# Patient Record
Sex: FEMALE | Race: ASIAN | NOT HISPANIC OR LATINO | URBAN - METROPOLITAN AREA
[De-identification: names, ages, dates, MRNs, and addresses within clinical notes are randomized per-mention and may not be internally consistent; named-entity substitution may affect disease eponyms.]

---

## 2019-05-19 ENCOUNTER — INPATIENT (INPATIENT)
Facility: HOSPITAL | Age: 29
LOS: 1 days | Discharge: HOME | End: 2019-05-21
Attending: OBSTETRICS & GYNECOLOGY | Admitting: OBSTETRICS & GYNECOLOGY

## 2019-05-19 VITALS — DIASTOLIC BLOOD PRESSURE: 76 MMHG | SYSTOLIC BLOOD PRESSURE: 149 MMHG | HEART RATE: 72 BPM

## 2019-05-19 DIAGNOSIS — O26.899 OTHER SPECIFIED PREGNANCY RELATED CONDITIONS, UNSPECIFIED TRIMESTER: ICD-10-CM

## 2019-05-19 DIAGNOSIS — Z3A.00 WEEKS OF GESTATION OF PREGNANCY NOT SPECIFIED: ICD-10-CM

## 2019-05-19 LAB
ALBUMIN SERPL ELPH-MCNC: 3.8 G/DL — SIGNIFICANT CHANGE UP (ref 3.3–5)
ALP SERPL-CCNC: 129 U/L — HIGH (ref 40–120)
ALT FLD-CCNC: 14 U/L — SIGNIFICANT CHANGE UP (ref 4–33)
ANION GAP SERPL CALC-SCNC: 16 MMO/L — HIGH (ref 7–14)
APTT BLD: 31 SEC — SIGNIFICANT CHANGE UP (ref 27.5–36.3)
AST SERPL-CCNC: 20 U/L — SIGNIFICANT CHANGE UP (ref 4–32)
BASOPHILS # BLD AUTO: 0.07 K/UL — SIGNIFICANT CHANGE UP (ref 0–0.2)
BASOPHILS NFR BLD AUTO: 0.6 % — SIGNIFICANT CHANGE UP (ref 0–2)
BILIRUB SERPL-MCNC: 0.3 MG/DL — SIGNIFICANT CHANGE UP (ref 0.2–1.2)
BLD GP AB SCN SERPL QL: NEGATIVE — SIGNIFICANT CHANGE UP
BUN SERPL-MCNC: 5 MG/DL — LOW (ref 7–23)
CALCIUM SERPL-MCNC: 9.5 MG/DL — SIGNIFICANT CHANGE UP (ref 8.4–10.5)
CHLORIDE SERPL-SCNC: 109 MMOL/L — HIGH (ref 98–107)
CO2 SERPL-SCNC: 21 MMOL/L — LOW (ref 22–31)
CREAT SERPL-MCNC: 0.56 MG/DL — SIGNIFICANT CHANGE UP (ref 0.5–1.3)
EOSINOPHIL # BLD AUTO: 0.02 K/UL — SIGNIFICANT CHANGE UP (ref 0–0.5)
EOSINOPHIL NFR BLD AUTO: 0.2 % — SIGNIFICANT CHANGE UP (ref 0–6)
FIBRINOGEN PPP-MCNC: 724 MG/DL — HIGH (ref 350–510)
GLUCOSE SERPL-MCNC: 91 MG/DL — SIGNIFICANT CHANGE UP (ref 70–99)
HCT VFR BLD CALC: 39.6 % — SIGNIFICANT CHANGE UP (ref 34.5–45)
HGB BLD-MCNC: 13 G/DL — SIGNIFICANT CHANGE UP (ref 11.5–15.5)
IMM GRANULOCYTES NFR BLD AUTO: 2 % — HIGH (ref 0–1.5)
INR BLD: 0.82 — LOW (ref 0.88–1.17)
LDH SERPL L TO P-CCNC: 207 U/L — SIGNIFICANT CHANGE UP (ref 135–225)
LYMPHOCYTES # BLD AUTO: 1.06 K/UL — SIGNIFICANT CHANGE UP (ref 1–3.3)
LYMPHOCYTES # BLD AUTO: 9 % — LOW (ref 13–44)
MCHC RBC-ENTMCNC: 31 PG — SIGNIFICANT CHANGE UP (ref 27–34)
MCHC RBC-ENTMCNC: 32.8 % — SIGNIFICANT CHANGE UP (ref 32–36)
MCV RBC AUTO: 94.5 FL — SIGNIFICANT CHANGE UP (ref 80–100)
MONOCYTES # BLD AUTO: 0.91 K/UL — HIGH (ref 0–0.9)
MONOCYTES NFR BLD AUTO: 7.7 % — SIGNIFICANT CHANGE UP (ref 2–14)
NEUTROPHILS # BLD AUTO: 9.5 K/UL — HIGH (ref 1.8–7.4)
NEUTROPHILS NFR BLD AUTO: 80.5 % — HIGH (ref 43–77)
NRBC # FLD: 0.02 K/UL — SIGNIFICANT CHANGE UP (ref 0–0)
PLATELET # BLD AUTO: 187 K/UL — SIGNIFICANT CHANGE UP (ref 150–400)
PMV BLD: 10 FL — SIGNIFICANT CHANGE UP (ref 7–13)
POTASSIUM SERPL-MCNC: 3.8 MMOL/L — SIGNIFICANT CHANGE UP (ref 3.5–5.3)
POTASSIUM SERPL-SCNC: 3.8 MMOL/L — SIGNIFICANT CHANGE UP (ref 3.5–5.3)
PROT SERPL-MCNC: 6.7 G/DL — SIGNIFICANT CHANGE UP (ref 6–8.3)
PROTHROM AB SERPL-ACNC: 9.3 SEC — LOW (ref 9.8–13.1)
RBC # BLD: 4.19 M/UL — SIGNIFICANT CHANGE UP (ref 3.8–5.2)
RBC # FLD: 12.1 % — SIGNIFICANT CHANGE UP (ref 10.3–14.5)
RH IG SCN BLD-IMP: POSITIVE — SIGNIFICANT CHANGE UP
RH IG SCN BLD-IMP: POSITIVE — SIGNIFICANT CHANGE UP
SODIUM SERPL-SCNC: 146 MMOL/L — HIGH (ref 135–145)
T PALLIDUM AB TITR SER: NEGATIVE — SIGNIFICANT CHANGE UP
URATE SERPL-MCNC: 4 MG/DL — SIGNIFICANT CHANGE UP (ref 2.5–7)
WBC # BLD: 11.79 K/UL — HIGH (ref 3.8–10.5)
WBC # FLD AUTO: 11.79 K/UL — HIGH (ref 3.8–10.5)

## 2019-05-19 RX ORDER — IBUPROFEN 200 MG
600 TABLET ORAL EVERY 6 HOURS
Refills: 0 | Status: COMPLETED | OUTPATIENT
Start: 2019-05-19 | End: 2020-04-16

## 2019-05-19 RX ORDER — TETANUS TOXOID, REDUCED DIPHTHERIA TOXOID AND ACELLULAR PERTUSSIS VACCINE, ADSORBED 5; 2.5; 8; 8; 2.5 [IU]/.5ML; [IU]/.5ML; UG/.5ML; UG/.5ML; UG/.5ML
0.5 SUSPENSION INTRAMUSCULAR ONCE
Refills: 0 | Status: DISCONTINUED | OUTPATIENT
Start: 2019-05-19 | End: 2019-05-21

## 2019-05-19 RX ORDER — OXYCODONE HYDROCHLORIDE 5 MG/1
5 TABLET ORAL ONCE
Refills: 0 | Status: DISCONTINUED | OUTPATIENT
Start: 2019-05-19 | End: 2019-05-21

## 2019-05-19 RX ORDER — OXYTOCIN 10 UNIT/ML
VIAL (ML) INJECTION
Qty: 20 | Refills: 0 | Status: DISCONTINUED | OUTPATIENT
Start: 2019-05-19 | End: 2019-05-19

## 2019-05-19 RX ORDER — MAGNESIUM HYDROXIDE 400 MG/1
30 TABLET, CHEWABLE ORAL
Refills: 0 | Status: DISCONTINUED | OUTPATIENT
Start: 2019-05-19 | End: 2019-05-21

## 2019-05-19 RX ORDER — HYDROCORTISONE 1 %
1 OINTMENT (GRAM) TOPICAL EVERY 6 HOURS
Refills: 0 | Status: DISCONTINUED | OUTPATIENT
Start: 2019-05-19 | End: 2019-05-21

## 2019-05-19 RX ORDER — DIPHENHYDRAMINE HCL 50 MG
25 CAPSULE ORAL EVERY 6 HOURS
Refills: 0 | Status: DISCONTINUED | OUTPATIENT
Start: 2019-05-19 | End: 2019-05-21

## 2019-05-19 RX ORDER — IBUPROFEN 200 MG
600 TABLET ORAL EVERY 6 HOURS
Refills: 0 | Status: DISCONTINUED | OUTPATIENT
Start: 2019-05-19 | End: 2019-05-21

## 2019-05-19 RX ORDER — DOCUSATE SODIUM 100 MG
100 CAPSULE ORAL
Refills: 0 | Status: DISCONTINUED | OUTPATIENT
Start: 2019-05-19 | End: 2019-05-19

## 2019-05-19 RX ORDER — BENZOCAINE 10 %
1 GEL (GRAM) MUCOUS MEMBRANE EVERY 6 HOURS
Refills: 0 | Status: DISCONTINUED | OUTPATIENT
Start: 2019-05-19 | End: 2019-05-21

## 2019-05-19 RX ORDER — OXYCODONE HYDROCHLORIDE 5 MG/1
5 TABLET ORAL
Refills: 0 | Status: DISCONTINUED | OUTPATIENT
Start: 2019-05-19 | End: 2019-05-21

## 2019-05-19 RX ORDER — KETOROLAC TROMETHAMINE 30 MG/ML
30 SYRINGE (ML) INJECTION ONCE
Refills: 0 | Status: DISCONTINUED | OUTPATIENT
Start: 2019-05-19 | End: 2019-05-19

## 2019-05-19 RX ORDER — OXYTOCIN 10 UNIT/ML
333.33 VIAL (ML) INJECTION
Qty: 20 | Refills: 0 | Status: DISCONTINUED | OUTPATIENT
Start: 2019-05-19 | End: 2019-05-20

## 2019-05-19 RX ORDER — OXYTOCIN 10 UNIT/ML
2 VIAL (ML) INJECTION
Qty: 30 | Refills: 0 | Status: DISCONTINUED | OUTPATIENT
Start: 2019-05-19 | End: 2019-05-20

## 2019-05-19 RX ORDER — DOCUSATE SODIUM 100 MG
100 CAPSULE ORAL THREE TIMES A DAY
Refills: 0 | Status: DISCONTINUED | OUTPATIENT
Start: 2019-05-19 | End: 2019-05-21

## 2019-05-19 RX ORDER — SODIUM CHLORIDE 9 MG/ML
3 INJECTION INTRAMUSCULAR; INTRAVENOUS; SUBCUTANEOUS EVERY 8 HOURS
Refills: 0 | Status: DISCONTINUED | OUTPATIENT
Start: 2019-05-19 | End: 2019-05-21

## 2019-05-19 RX ORDER — GLYCERIN ADULT
1 SUPPOSITORY, RECTAL RECTAL AT BEDTIME
Refills: 0 | Status: DISCONTINUED | OUTPATIENT
Start: 2019-05-19 | End: 2019-05-21

## 2019-05-19 RX ORDER — AER TRAVELER 0.5 G/1
1 SOLUTION RECTAL; TOPICAL EVERY 4 HOURS
Refills: 0 | Status: DISCONTINUED | OUTPATIENT
Start: 2019-05-19 | End: 2019-05-21

## 2019-05-19 RX ORDER — LANOLIN
1 OINTMENT (GRAM) TOPICAL EVERY 6 HOURS
Refills: 0 | Status: DISCONTINUED | OUTPATIENT
Start: 2019-05-19 | End: 2019-05-21

## 2019-05-19 RX ORDER — ACETAMINOPHEN 500 MG
975 TABLET ORAL EVERY 6 HOURS
Refills: 0 | Status: DISCONTINUED | OUTPATIENT
Start: 2019-05-19 | End: 2019-05-21

## 2019-05-19 RX ORDER — SODIUM CHLORIDE 9 MG/ML
1000 INJECTION, SOLUTION INTRAVENOUS
Refills: 0 | Status: DISCONTINUED | OUTPATIENT
Start: 2019-05-19 | End: 2019-05-19

## 2019-05-19 RX ORDER — SIMETHICONE 80 MG/1
80 TABLET, CHEWABLE ORAL EVERY 4 HOURS
Refills: 0 | Status: DISCONTINUED | OUTPATIENT
Start: 2019-05-19 | End: 2019-05-21

## 2019-05-19 RX ORDER — DIBUCAINE 1 %
1 OINTMENT (GRAM) RECTAL EVERY 6 HOURS
Refills: 0 | Status: DISCONTINUED | OUTPATIENT
Start: 2019-05-19 | End: 2019-05-21

## 2019-05-19 RX ORDER — PRAMOXINE HYDROCHLORIDE 150 MG/15G
1 AEROSOL, FOAM RECTAL EVERY 4 HOURS
Refills: 0 | Status: DISCONTINUED | OUTPATIENT
Start: 2019-05-19 | End: 2019-05-21

## 2019-05-19 RX ADMIN — Medication 30 MILLIGRAM(S): at 16:32

## 2019-05-19 RX ADMIN — Medication 100 MILLIGRAM(S): at 21:13

## 2019-05-19 RX ADMIN — SODIUM CHLORIDE 125 MILLILITER(S): 9 INJECTION, SOLUTION INTRAVENOUS at 08:37

## 2019-05-19 RX ADMIN — Medication 2 MILLIUNIT(S)/MIN: at 14:31

## 2019-05-19 RX ADMIN — SODIUM CHLORIDE 3 MILLILITER(S): 9 INJECTION INTRAMUSCULAR; INTRAVENOUS; SUBCUTANEOUS at 22:10

## 2019-05-19 RX ADMIN — Medication 975 MILLIGRAM(S): at 21:53

## 2019-05-19 RX ADMIN — Medication 1000 MILLIUNIT(S)/MIN: at 15:40

## 2019-05-19 RX ADMIN — Medication 975 MILLIGRAM(S): at 21:13

## 2019-05-19 RX ADMIN — Medication 30 MILLIGRAM(S): at 15:40

## 2019-05-19 NOTE — OB NEONATOLOGY/PEDIATRICIAN DELIVERY SUMMARY - NSPEDSNEONOTESA_OBGYN_ALL_OB_FT
40.1 week GA baby girl born to a 29 y/o  via VAVD. Maternal history noncontributory, preg uncomplicated. Maternal blood type B+. Prenatal labs neg/nr/immune with GBS neg from 5/3. SROM for <18 hours with clear fluid. Infant emerged vigorous and crying, w/d/s/s, sig circumferential swelling w/ possible hematoma on patient's head with two 3cm lacerations on patients anterior forehead, no active bleeding secondary to VAVD. APGARs 9/9, EOS = 0.38

## 2019-05-19 NOTE — OB NEONATOLOGY/PEDIATRICIAN DELIVERY SUMMARY - NS_BIRTHTRAUMADETAILSA_OBGYN_ALL_OB_FT
sig circumferential swelling w/ possible hematoma on patient's head with two 3cm lacerations on patients anterior forehead, no active bleeding secondary to VAVD

## 2019-05-19 NOTE — OB PROVIDER H&P - NSNYCREQUIREMENTS_OBGYN_ALL_OB
"I have reviewed the H&P which is current within the last 30 days. I have examined the patient, and the changes to the patient's condition are as follows: none. Risks and benefits, as well as alternatives of hemorrhoidectomy were discussed with the patient and they would like to proceed.      Visit Vitals  /67   Pulse 60   Temp 98 Â°F (36.7 Â°C) (Temporal Artery)   Resp 17   Ht 5' 4"" (1.626 m)   Wt 96.8 kg   SpO2 94%   BMI 36.63 kg/mÂ²       Date of service: 11/21/2017  "
ROSALINE

## 2019-05-19 NOTE — OB PROVIDER TRIAGE NOTE - HISTORY OF PRESENT ILLNESS
28 yr old G1P) @ 40.1 wks presents as r/o labor. denies VB/LOF. reports positive fetal movement.   PNI: transverse presentation , now vertex, GBS negative

## 2019-05-19 NOTE — CHART NOTE - NSCHARTNOTEFT_GEN_A_CORE
Patient examined for labor progression.     SVE - 9/90/0  EFM - 145/mod/+acccels/-deccels  [place] - ctx q2 mins    Vital Signs Last 24 Hrs  T(C): 36.7 (19 May 2019 08:44), Max: 36.8 (19 May 2019 06:04)  T(F): 98.06 (19 May 2019 08:44), Max: 98.24 (19 May 2019 06:23)  HR: 76 (19 May 2019 10:04) (64 - 85)  BP: 120/68 (19 May 2019 09:59) (111/66 - 149/76)  BP(mean): --  RR: 18 (19 May 2019 06:53) (18 - 18)  SpO2: 100% (19 May 2019 10:04) (90% - 100%)    A/P: Patient is a P0 @ 40+1wks, admitted in labor. She made change from 5cm to 9cm.   -FHT CAT I   -Epidural in situ, effective  -Continue expectant management    Dr. Starks en route  Cheli Briscoe PGY-1

## 2019-05-19 NOTE — DISCHARGE NOTE OB - PATIENT PORTAL LINK FT
You can access the [facility] Patient Portal, offered by [facility], by registering with the following website: [email or web address]

## 2019-05-19 NOTE — OB PROVIDER DELIVERY SUMMARY - NSPROVIDERDELIVERYNOTE_OBGYN_ALL_OB_FT
Ms Damico delivered a live female from the GUSTAVO position having 9/9 APGARs over an intact perineum.  At partuition, spontaneous cry noted and nares/mouth suctioned pre and post delivery.  Neonte to maternal abdomen, crying and vigorous.  Cord clamped and cut upon cessation of pulsations and cord gases obtained.   to warmer for further  assessment/care.  Pitocin bolus given and placenta delivered intact approximately 5 mins later.  Perineum intact.  EAS intact.  EBL=300ccs   to NBN.	  There is no increased risk for PPH or VTE at this time. Ms Damico delivered a live female from the DOP position having 9/9 APGARs over a 3rd degree laceration.  Prior to partuition, pt pushing inadequately with vertex in LOT position but adequate pelvimetry. Discussed with couple to facilitate delivery, would employ vacuum assistance.   Kiwi applied at +3/5 station. Six sets of pulls were utilized to effect delivery.  At partuition, spontaneous cry noted and nares/mouth suctioned pre and post delivery and truncal cord x 1 was reduced.  Neonte to maternal abdomen, crying and vigorous.  Cord clamped and cut upon cessation of pulsations and cord gases obtained.   to warmer for further  assessment/care by the pediatricians who were present for the delivery.  Pitocin bolus given and placenta delivered intact approximately 5 mins later.  Perineum repaired with 2-0 and 3-0 chromic.  EAS intact.  EBL=300ccs   to NBN.	  There is no increased risk for PPH or VTE at this time.

## 2019-05-19 NOTE — OB RN PATIENT PROFILE - ABILITY TO HEAR (WITH HEARING AID OR HEARING APPLIANCE IF NORMALLY USED):
"Chief Complaint   Patient presents with     ER F/U     [facility] - 8/13/17       Initial /80 (BP Location: Right arm, Patient Position: Right side, Cuff Size: Adult Regular)  Pulse 84  Temp 98.2  F (36.8  C) (Oral)  Resp 14  Ht 1.956 m (6' 5\")  Wt (!) 142.8 kg (314 lb 12.8 oz)  SpO2 96%  BMI 37.33 kg/m2 Estimated body mass index is 37.33 kg/(m^2) as calculated from the following:    Height as of this encounter: 1.956 m (6' 5\").    Weight as of this encounter: 142.8 kg (314 lb 12.8 oz).  Medication Reconciliation: complete     Will Benoit WINKLER      "
Detail Level: Zone
Adequate: hears normal conversation without difficulty

## 2019-05-19 NOTE — OB NEONATOLOGY/PEDIATRICIAN DELIVERY SUMMARY - NSPHYSICALEXAMDETAILSA_OBGYN_ALL_OB_FT
sig circumferential swelling w/ possible hematoma on patient's head with two 3cm lacerations on patients anterior forehead, no active bleeding

## 2019-05-19 NOTE — DISCHARGE NOTE OB - MEDICATION SUMMARY - MEDICATIONS TO TAKE
I will START or STAY ON the medications listed below when I get home from the hospital:    Prenatal 1 oral capsule  -- Indication: For multivitamin

## 2019-05-19 NOTE — OB RN DELIVERY SUMMARY - NS_BIRTHTRAUMADETAILSA_OBGYN_ALL_OB_FT
2 abrasions present on babys head where kiwi was placed. peds aware and ordered bacitracin to be placed on abrasions. md landa discussed findings with pt and family. no popoffs of kiwi were noted during delivery

## 2019-05-19 NOTE — OB PROVIDER IHI INDUCTION/AUGMENTATION NOTE - NS_OBIHIADDITIONDETAILS_OBGYN_ALL_OB_FT
D/W couple low-dose Pitocin augmentation to increase strength of contractions to allow for additional descent of vertex to allow for placement of Kiwi Vacuum to assist with/facilitate delivery, due to poor maternal effort.  Pelvimetry and fetal position and EFW all WNL.  Cat I FHT

## 2019-05-19 NOTE — OB PROVIDER TRIAGE NOTE - NSOBPROVIDERNOTE_OBGYN_ALL_OB_FT
28 yr old,  @ 40.1 wks r/o labor, requesting epidural  Admit  Expectant management  GBS negative  CAT 1 tracing  [place]: ctx q4 min  VE: 5/90/-2, intact  Sono: vertex  EFW 3400

## 2019-05-19 NOTE — DISCHARGE NOTE OB - CARE PROVIDER_API CALL
Ludwig Starks)  Obstetrics and Gynecology  [address] [place] [address]  Phone: [phone]  Fax: [phone]  Follow Up Time:

## 2019-05-19 NOTE — CHART NOTE - NSCHARTNOTEFT_GEN_A_CORE
IN to see pt.  Pt pushing since approx 12:30PM  VE=10/100/+2 with minimal effort.  No molding or caput; DOA; adequate pelvimetry  Epidural suspended.  Cat I FHT with ctxs Q2-3 mins.  D/W couple option of VAVD with R/B/As and Pit augmentation.  Will attempt VAVD when vertex is +2-+3 due to poor maternal effort.  Low dose Pit augmentation at 2mu/min to be utilized as well.

## 2019-05-19 NOTE — DISCHARGE NOTE OB - MATERIALS PROVIDED
[facility] Hearing Screen Program/Breastfeeding Mother’s Support Group Information/Guide to Postpartum Care/Discharge Medication Information for Patients and Families Pocket Guide/  Immunization Record/Breastfeeding Log/Tdap Vaccination (VIS Pub Date: 2012)/Shaken Baby Prevention Handout/Birth Certificate Instructions/[facility]  Screening Program/Breastfeeding Guide and Packet/MMR Vaccination (VIS Pub Date: 2012)

## 2019-05-19 NOTE — CHART NOTE - NSCHARTNOTEFT_GEN_A_CORE
In to see patient.  Pt comfortable.  VE=10/100/0 in GUSTAVO position.  Leopold's: WNL and Vtx; Aprox 7.5#  No urge to push.  Clear fluid noted.  Cat I FHT  D/W couple status and upcoming 2nd Stage.  Begin pushing within 1 hour  CPC  Expecting

## 2019-05-19 NOTE — DISCHARGE NOTE OB - CARE PLAN
Principal Discharge DX:	Delivery normal  Goal:	Recovery  Assessment and plan of treatment:	PP Care  Secondary Diagnosis:	Labor without complication

## 2019-05-20 RX ADMIN — Medication 600 MILLIGRAM(S): at 12:53

## 2019-05-20 RX ADMIN — Medication 975 MILLIGRAM(S): at 21:36

## 2019-05-20 RX ADMIN — Medication 975 MILLIGRAM(S): at 22:06

## 2019-05-20 RX ADMIN — Medication 600 MILLIGRAM(S): at 01:20

## 2019-05-20 RX ADMIN — Medication 100 MILLIGRAM(S): at 16:30

## 2019-05-20 RX ADMIN — Medication 975 MILLIGRAM(S): at 07:00

## 2019-05-20 RX ADMIN — SODIUM CHLORIDE 3 MILLILITER(S): 9 INJECTION INTRAMUSCULAR; INTRAVENOUS; SUBCUTANEOUS at 06:30

## 2019-05-20 RX ADMIN — Medication 975 MILLIGRAM(S): at 17:26

## 2019-05-20 RX ADMIN — Medication 975 MILLIGRAM(S): at 06:02

## 2019-05-20 RX ADMIN — Medication 100 MILLIGRAM(S): at 06:02

## 2019-05-20 RX ADMIN — Medication 600 MILLIGRAM(S): at 00:35

## 2019-05-20 RX ADMIN — Medication 600 MILLIGRAM(S): at 13:53

## 2019-05-20 RX ADMIN — Medication 975 MILLIGRAM(S): at 16:30

## 2019-05-20 RX ADMIN — Medication 100 MILLIGRAM(S): at 21:32

## 2019-05-20 NOTE — LACTATION INITIAL EVALUATION - LACTATION INTERVENTIONS
assisted with deep latch and positioning  discussed  signs  of  effective  feeding and  swallowing.  discussed  compression at  breast when  nbn  stops  drinking  and  is  still sucking.  instructed  to offer both  breast at a feeding ,feed on cue and safe  skin to skin./initiate hand expression routine/initiate dual electric pump routine/initiate skin to skin

## 2019-05-20 NOTE — LACTATION INITIAL EVALUATION - INTERVENTION OUTCOME
nbn  ineffective  .  pushing out nipple  , reviewed tongue    exercises for  mother  to perform with nbn,  if  nbn  not  breastfeeding  effectively  hand  express  and  pump  and   give  teaspoons  between  feedings alternative  feeding   method.  . expressed  10 ml  and  alternative  feeding demonstrated  with  spoon .  continue to assess./verbalizes understanding

## 2019-05-21 VITALS
SYSTOLIC BLOOD PRESSURE: 118 MMHG | TEMPERATURE: 98 F | DIASTOLIC BLOOD PRESSURE: 65 MMHG | HEART RATE: 69 BPM | OXYGEN SATURATION: 99 % | RESPIRATION RATE: 18 BRPM

## 2019-05-21 RX ADMIN — Medication 600 MILLIGRAM(S): at 06:39

## 2019-05-21 RX ADMIN — Medication 600 MILLIGRAM(S): at 00:30

## 2019-05-21 RX ADMIN — Medication 1 APPLICATION(S): at 11:25

## 2019-05-21 RX ADMIN — Medication 975 MILLIGRAM(S): at 11:24

## 2019-05-21 RX ADMIN — Medication 1 TABLET(S): at 11:23

## 2019-05-21 RX ADMIN — Medication 600 MILLIGRAM(S): at 01:15

## 2019-05-21 RX ADMIN — SIMETHICONE 80 MILLIGRAM(S): 80 TABLET, CHEWABLE ORAL at 06:05

## 2019-05-21 RX ADMIN — PRAMOXINE HYDROCHLORIDE 1 APPLICATION(S): 150 AEROSOL, FOAM RECTAL at 11:25

## 2019-05-21 RX ADMIN — Medication 600 MILLIGRAM(S): at 06:06

## 2019-05-21 RX ADMIN — Medication 100 MILLIGRAM(S): at 06:06

## 2019-05-21 RX ADMIN — Medication 975 MILLIGRAM(S): at 11:55

## 2019-05-21 NOTE — PROGRESS NOTE ADULT - ASSESSMENT
A/P: 29yo GP PPD#2 s/p VAVD c/b 3rd degree laceration.   Patient doing well postpartum.
A/P: 29yo GP PPD#1 s/p VAVD c/b 3rd degree laceration.   Patient is doing well postpartum.

## 2019-05-21 NOTE — PROGRESS NOTE ADULT - PROBLEM SELECTOR PLAN 1
- Pain well controlled, continue current pain regimen  - Increase ambulation, SCDs when not ambulating  - Continue regular diet  - Discharge Planning    Ney Jimenez PGY-1
- Pain well controlled, continue current pain regimen  - Increase ambulation, SCDs when not ambulating  - Continue regular diet  - Standing azam Jimenez PGY-1

## 2019-05-21 NOTE — PROGRESS NOTE ADULT - SUBJECTIVE AND OBJECTIVE BOX
OB Progress Note: VAVD PPD#2    S: 29yo GP PPD#2 s/p VAVD c/b 3rd degree laceration. Patient feels well. Pain is well controlled. She is tolerating a regular diet and passing flatus. She is voiding spontaneously, and ambulating without difficulty. She is breastfeeding. Denies CP/SOB. Denies lightheadedness/dizziness. Denies N/V.    O:  Vitals:  Vital Signs Last 24 Hrs  T(C): 36.7 (21 May 2019 05:29), Max: 36.7 (20 May 2019 17:25)  T(F): 98.1 (21 May 2019 05:29), Max: 98.1 (21 May 2019 05:29)  HR: 69 (21 May 2019 05:29) (69 - 72)  BP: 118/65 (21 May 2019 05:29) (118/65 - 121/62)  BP(mean): --  RR: 18 (21 May 2019 05:29) (18 - 18)  SpO2: 99% (21 May 2019 05:29) (98% - 99%)    MEDICATIONS  (STANDING):  acetaminophen   Tablet .. 975 milliGRAM(s) Oral every 6 hours  diphtheria/tetanus/pertussis (acellular) Vaccine (ADAcel) 0.5 milliLiter(s) IntraMuscular once  docusate sodium 100 milliGRAM(s) Oral three times a day  ibuprofen  Tablet. 600 milliGRAM(s) Oral every 6 hours  prenatal multivitamin 1 Tablet(s) Oral daily  sodium chloride 0.9% lock flush 3 milliLiter(s) IV Push every 8 hours      Labs:  Blood type: B Positive  Rubella IgG: RPR: Negative                          13.0   11.79<H> >-----------< 187    ( 05-19 @ 06:34 )             39.6    05-19-19 @ 07:14      146<H>  |  109<H>  |  5<L>  ----------------------------<  91  3.8   |  21<L>  |  0.56        Ca    9.5      19 May 2019 07:14    TPro  6.7  /  Alb  3.8  /  TBili  0.3  /  DBili  x   /  AST  20  /  ALT  14  /  AlkPhos  129<H>  05-19-19 @ 07:14          Physical Exam:  General: NAD  Abdomen: soft, non-tender, non-distended, fundus firm  Vaginal: Lochia wnl  Extremities: No erythema/edema
OB Progress Note: VAVD PPD#1    S: 27yo GP PPD#1 s/p VAVD c/b 3rd degree laceration. Patient feels well. Pain is well controlled. She is tolerating a regular diet and passing flatus. She is voiding spontaneously, and ambulating without difficulty. Denies CP/SOB. Denies lightheadedness/dizziness. Denies N/V.    O:  Vitals:  Vital Signs Last 24 Hrs  T(C): 36.7 (20 May 2019 06:16), Max: 37.8 (19 May 2019 12:54)  T(F): 98.1 (20 May 2019 06:16), Max: 100.04 (19 May 2019 12:54)  HR: 85 (20 May 2019 06:16) (64 - 137)  BP: 107/62 (20 May 2019 06:16) (107/62 - 185/81)  BP(mean): --  RR: 18 (20 May 2019 06:16) (18 - 20)  SpO2: 99% (20 May 2019 06:16) (69% - 100%)    MEDICATIONS  (STANDING):  acetaminophen   Tablet .. 975 milliGRAM(s) Oral every 6 hours  diphtheria/tetanus/pertussis (acellular) Vaccine (ADAcel) 0.5 milliLiter(s) IntraMuscular once  docusate sodium 100 milliGRAM(s) Oral three times a day  ibuprofen  Tablet. 600 milliGRAM(s) Oral every 6 hours  prenatal multivitamin 1 Tablet(s) Oral daily  sodium chloride 0.9% lock flush 3 milliLiter(s) IV Push every 8 hours      Labs:  Blood type: B Positive  Rubella IgG: RPR: Negative                          13.0   11.79<H> >-----------< 187    ( 05-19 @ 06:34 )             39.6    05-19-19 @ 07:14      146<H>  |  109<H>  |  5<L>  ----------------------------<  91  3.8   |  21<L>  |  0.56        Ca    9.5      19 May 2019 07:14    TPro  6.7  /  Alb  3.8  /  TBili  0.3  /  DBili  x   /  AST  20  /  ALT  14  /  AlkPhos  129<H>  05-19-19 @ 07:14          Physical Exam:  General: NAD  Abdomen: soft, non-tender, non-distended, fundus firm  Vaginal: Lochia wnl  Extremities: No erythema/edema
POD#1 S/P     Patient is doing well. OOBAA. Tolerating clears. Denies N/V/Dizziness/Ha. Patient denies numbness/tingliness/pain to LE. No residual anesthetic issues or complications noted. V/S WNL as per flowsheet.
Post-partum Note,   She is a  28y woman who is now post-partum day: 1    Subjective:  The patient feels well.  She is ambulating.   She is tolerating regular diet.  She denies nausea and vomiting; denies fever.  She is voiding.  Her pain is controlled.  She reports normal postpartum bleeding.  She is breastfeeding.  She is formula feeding.    Physical exam:    Vital Signs Last 24 Hrs  T(C): 36.6 (20 May 2019 02:22), Max: 37.8 (19 May 2019 12:54)  T(F): 97.9 (20 May 2019 02:22), Max: 100.04 (19 May 2019 12:54)  HR: 76 (20 May 2019 02:22) (64 - 137)  BP: 116/79 (20 May 2019 02:22) (111/66 - 185/81)  BP(mean): --  RR: 18 (20 May 2019 02:22) (18 - 20)  SpO2: 97% (20 May 2019 02:22) (69% - 100%)    Gen: NAD  Breast: Soft, nontender, not engorged.  Abdomen: Soft, nontender, no distension , firm uterine fundus at umbilicus.  Pelvic: Normal lochia noted  Ext: No calf tenderness    LABS:                        13.0   11.79 )-----------( 187      ( 19 May 2019 06:34 )             39.6     ABO Interpretation: B (05-19 @ 06:22)  Rh Interpretation: Positive (05-19 @ 06:22)  ABO Interpretation: B (05-19 @ 06:18)  Rh Interpretation: Positive (-19 @ 06:18)  Antibody Screen: Negative (- @ 06:18)    Rubella status:     Allergies    No Known Allergies    Intolerances      MEDICATIONS  (STANDING):  acetaminophen   Tablet .. 975 milliGRAM(s) Oral every 6 hours  diphtheria/tetanus/pertussis (acellular) Vaccine (ADAcel) 0.5 milliLiter(s) IntraMuscular once  docusate sodium 100 milliGRAM(s) Oral three times a day  ibuprofen  Tablet. 600 milliGRAM(s) Oral every 6 hours  prenatal multivitamin 1 Tablet(s) Oral daily  sodium chloride 0.9% lock flush 3 milliLiter(s) IV Push every 8 hours    MEDICATIONS  (PRN):  benzocaine 20%/menthol 0.5% Spray 1 Spray(s) Topical every 6 hours PRN for Perineal discomfort  dibucaine 1% Ointment 1 Application(s) Topical every 6 hours PRN Perineal discomfort  diphenhydrAMINE 25 milliGRAM(s) Oral every 6 hours PRN Pruritus  glycerin Suppository - Adult 1 Suppository(s) Rectal at bedtime PRN Constipation  hydrocortisone 1% Cream 1 Application(s) Topical every 6 hours PRN Moderate Pain (4-6)  lanolin Ointment 1 Application(s) Topical every 6 hours PRN nipple soreness  magnesium hydroxide Suspension 30 milliLiter(s) Oral two times a day PRN Constipation  oxyCODONE    IR 5 milliGRAM(s) Oral every 3 hours PRN Moderate Pain (4 - 6)  oxyCODONE    IR 5 milliGRAM(s) Oral once PRN Severe Pain (7 -10)  pramoxine 1%/zinc 5% Cream 1 Application(s) Topical every 4 hours PRN Moderate Pain (4-6)  simethicone 80 milliGRAM(s) Chew every 4 hours PRN Gas  witch hazel Pads 1 Application(s) Topical every 4 hours PRN Perineal discomfort        Assessment and Plan  PPD #1 s/p VAVD  Doing well.  Encourage ambulation.  PP & PPD Instructions reviewed.  CPC.  D/C home tomorrow.

## 2022-07-27 NOTE — OB RN PATIENT PROFILE - NS PRO RUBELLA CONTRAINDICATIONS OB
"Requested Prescriptions   Pending Prescriptions Disp Refills     norgestimate-ethinyl estradiol (ESTARYLLA) 0.25-35 MG-MCG tablet 28 tablet 0     Sig: Take 1 tablet by mouth daily Skip placebo weeks. Due for follow up office visit       Contraceptives Protocol Passed - 7/26/2022  3:03 PM        Passed - Patient is not a current smoker if age is 35 or older        Passed - Recent (12 mo) or future (30 days) visit within the authorizing provider's specialty     Patient has had an office visit with the authorizing provider or a provider within the authorizing providers department within the previous 12 mos or has a future within next 30 days. See \"Patient Info\" tab in inbasket, or \"Choose Columns\" in Meds & Orders section of the refill encounter.              Passed - Medication is active on med list        Passed - No active pregnancy on record        Passed - No positive pregnancy test in past 12 months             Last Written Prescription Date:  07/06/2022  Last Fill Quantity: 28,  # refills: 0   Last office visit: Visit date not found with prescribing provider:  Eryn Chowdary   Future Office Visit: None          "
Pt due for annual, no appt scheduled. Pt already received one month extension. Rx denied.     Tiarra Capps RN on 7/27/2022 at 5:23 AM    
titers do not indicate a need to immunize
